# Patient Record
Sex: MALE
[De-identification: names, ages, dates, MRNs, and addresses within clinical notes are randomized per-mention and may not be internally consistent; named-entity substitution may affect disease eponyms.]

---

## 2020-09-14 ENCOUNTER — APPOINTMENT (OUTPATIENT)
Dept: SURGERY | Facility: CLINIC | Age: 70
End: 2020-09-14
Payer: MEDICARE

## 2020-09-14 VITALS
DIASTOLIC BLOOD PRESSURE: 88 MMHG | WEIGHT: 175 LBS | BODY MASS INDEX: 25.92 KG/M2 | RESPIRATION RATE: 15 BRPM | HEART RATE: 49 BPM | OXYGEN SATURATION: 98 % | HEIGHT: 69 IN | SYSTOLIC BLOOD PRESSURE: 161 MMHG | TEMPERATURE: 98 F

## 2020-09-14 DIAGNOSIS — Z82.3 FAMILY HISTORY OF STROKE: ICD-10-CM

## 2020-09-14 DIAGNOSIS — E78.00 PURE HYPERCHOLESTEROLEMIA, UNSPECIFIED: ICD-10-CM

## 2020-09-14 DIAGNOSIS — G06.0 INTRACRANIAL ABSCESS AND GRANULOMA: ICD-10-CM

## 2020-09-14 DIAGNOSIS — Z82.49 FAMILY HISTORY OF ISCHEMIC HEART DISEASE AND OTHER DISEASES OF THE CIRCULATORY SYSTEM: ICD-10-CM

## 2020-09-14 DIAGNOSIS — I10 ESSENTIAL (PRIMARY) HYPERTENSION: ICD-10-CM

## 2020-09-14 DIAGNOSIS — Z78.9 OTHER SPECIFIED HEALTH STATUS: ICD-10-CM

## 2020-09-14 DIAGNOSIS — E78.5 HYPERLIPIDEMIA, UNSPECIFIED: ICD-10-CM

## 2020-09-14 DIAGNOSIS — I49.9 CARDIAC ARRHYTHMIA, UNSPECIFIED: ICD-10-CM

## 2020-09-14 DIAGNOSIS — K64.5 PERIANAL VENOUS THROMBOSIS: ICD-10-CM

## 2020-09-14 DIAGNOSIS — Z87.19 PERSONAL HISTORY OF OTHER DISEASES OF THE DIGESTIVE SYSTEM: ICD-10-CM

## 2020-09-14 PROBLEM — Z00.00 ENCOUNTER FOR PREVENTIVE HEALTH EXAMINATION: Status: ACTIVE | Noted: 2020-09-14

## 2020-09-14 PROCEDURE — 46600 DIAGNOSTIC ANOSCOPY SPX: CPT

## 2020-09-14 PROCEDURE — 99204 OFFICE O/P NEW MOD 45 MIN: CPT

## 2020-09-14 RX ORDER — CHROMIUM 200 MCG
TABLET ORAL
Refills: 0 | Status: ACTIVE | COMMUNITY

## 2020-09-14 RX ORDER — MILK THISTLE 150 MG
CAPSULE ORAL
Refills: 0 | Status: ACTIVE | COMMUNITY

## 2020-09-14 RX ORDER — SIMVASTATIN 80 MG/1
TABLET, FILM COATED ORAL
Refills: 0 | Status: ACTIVE | COMMUNITY

## 2020-09-14 RX ORDER — HYDROCORTISONE 25 MG/G
2.5 CREAM TOPICAL TWICE DAILY
Qty: 1 | Refills: 0 | Status: ACTIVE | COMMUNITY
Start: 2020-09-14 | End: 1900-01-01

## 2020-09-14 RX ORDER — ATENOLOL 50 MG/1
TABLET ORAL
Refills: 0 | Status: ACTIVE | COMMUNITY

## 2020-09-14 RX ORDER — MULTIVIT-MIN/FOLIC/VIT K/LYCOP 400-300MCG
TABLET ORAL
Refills: 0 | Status: ACTIVE | COMMUNITY

## 2020-09-23 PROBLEM — K64.5 INTERNAL THROMBOSED HEMORRHOIDS: Status: ACTIVE | Noted: 2020-09-14

## 2020-09-23 NOTE — PHYSICAL EXAM
[FreeTextEntry1] : This is a 70 year-old well-developed male in no apparent distress.\par \par HEENT normocephalic, anicteric, external ears normal bilaterally, EOMs intact.\par \par Cardiac - regular rate and rhythm.\par \par Abdomen soft, nontender, nondistended, no masses. No hepatosplenomegaly.\par \par No inguinal lymphadenopathy bilaterally.\par \par Examination of the perineum reveals a left lateral thrombosed internal hemorrhoid, this is not tender. Digital rectal examination reveals normal sphincter tone. \par Anoscopy reveals small internal hemorrhoids. \par \par Neuro-cranial nerves grossly intact. Normal gait.\par \par Psychiatric-oriented to time place and person. Good understanding of conversation.\par \par \par

## 2020-09-23 NOTE — ASSESSMENT
[FreeTextEntry1] : 71 yo male with an internal thrombosed hemorrhoid, minimally symptomatic at this time. I discussed options of excision of the thrombosed hemorrhoid vs. observation, explained that excision would need to be under sedation in the OR (hemorrhoidectomy), and advised for nonoperative management, as this will likely result in shorter recovery. Expect the thrombus to resolve in a couple of weeks. I prescribed hemorrhoidal cream for him.   \par RTO as needed.\par \par \par

## 2020-09-23 NOTE — HISTORY OF PRESENT ILLNESS
[FreeTextEntry1] : 70 year old male with c/o a perianal lump for the past week, he noticed after an episode of straining. Reports mild associated pain, mainly when sitting. Has daily Bm with occasional constipation. Denies the use of analgesics or laxatives.  \par Colonoscopy was performed 4 years ago by Dr. Villasenor reported that was normal .

## 2020-09-23 NOTE — CONSULT LETTER
[Consult Letter:] : I had the pleasure of evaluating your patient, [unfilled]. [Dear  ___] : Dear ~JOANIE, [Please see my note below.] : Please see my note below. [Consult Closing:] : Thank you very much for allowing me to participate in the care of this patient.  If you have any questions, please do not hesitate to contact me. [Sincerely,] : Sincerely, [FreeTextEntry2] : Dr. Kolby Summers [FreeTextEntry3] : Karishma Lopez M.D., F.A.C.S., F.MARILYN.S.C.R.S.\par Assistant Professor of Surgery\par [employer] [facility]\par \par

## 2025-08-03 ENCOUNTER — NON-APPOINTMENT (OUTPATIENT)
Age: 75
End: 2025-08-03

## 2025-08-14 ENCOUNTER — NON-APPOINTMENT (OUTPATIENT)
Age: 75
End: 2025-08-14

## 2025-08-22 ENCOUNTER — TRANSCRIPTION ENCOUNTER (OUTPATIENT)
Age: 75
End: 2025-08-22